# Patient Record
Sex: MALE | HISPANIC OR LATINO | Employment: FULL TIME | URBAN - METROPOLITAN AREA
[De-identification: names, ages, dates, MRNs, and addresses within clinical notes are randomized per-mention and may not be internally consistent; named-entity substitution may affect disease eponyms.]

---

## 2024-05-13 ENCOUNTER — HOSPITAL ENCOUNTER (EMERGENCY)
Facility: MEDICAL CENTER | Age: 19
End: 2024-05-13
Attending: STUDENT IN AN ORGANIZED HEALTH CARE EDUCATION/TRAINING PROGRAM
Payer: COMMERCIAL

## 2024-05-13 VITALS
DIASTOLIC BLOOD PRESSURE: 61 MMHG | TEMPERATURE: 99.4 F | RESPIRATION RATE: 18 BRPM | OXYGEN SATURATION: 96 % | HEART RATE: 86 BPM | WEIGHT: 158 LBS | SYSTOLIC BLOOD PRESSURE: 129 MMHG

## 2024-05-13 DIAGNOSIS — J10.1 INFLUENZA A: ICD-10-CM

## 2024-05-13 LAB
FLUAV RNA SPEC QL NAA+PROBE: POSITIVE
FLUBV RNA SPEC QL NAA+PROBE: NEGATIVE
RSV RNA SPEC QL NAA+PROBE: NEGATIVE
SARS-COV-2 RNA RESP QL NAA+PROBE: NOTDETECTED

## 2024-05-13 RX ORDER — IBUPROFEN 600 MG/1
600 TABLET ORAL EVERY 6 HOURS PRN
Status: DISCONTINUED | OUTPATIENT
Start: 2024-05-13 | End: 2024-05-13 | Stop reason: HOSPADM

## 2024-05-13 RX ORDER — ACETAMINOPHEN 325 MG/1
650 TABLET ORAL ONCE
Status: COMPLETED | OUTPATIENT
Start: 2024-05-13 | End: 2024-05-13

## 2024-05-13 RX ADMIN — IBUPROFEN 600 MG: 600 TABLET, FILM COATED ORAL at 05:36

## 2024-05-13 RX ADMIN — ACETAMINOPHEN 650 MG: 325 TABLET, FILM COATED ORAL at 05:35

## 2024-05-13 ASSESSMENT — PAIN DESCRIPTION - PAIN TYPE
TYPE: ACUTE PAIN
TYPE: ACUTE PAIN

## 2024-05-13 NOTE — ED NOTES
Provided patient with discharge instructions. Patient verbalized understanding of instructions. Patients vitals are within normal ranges for baseline. Patient is AxO 4 and GCS 15. Patient Patient is respirating with equal chest rise and fall bilaterally. Patient ambulated out of ED with steady gait. Patient educated to return to ED if symptoms persist or worsen.

## 2024-05-13 NOTE — ED PROVIDER NOTES
ED Provider Note    CHIEF COMPLAINT  Chief Complaint   Patient presents with    Flu Like Symptoms     Pt reports generalized body aches, cough, SOB w/ cough. Fever of 103.4f. symptoms x yesterday. Mask provided to pt. Rate pain 4/10 aching generalized       EXTERNAL RECORDS REVIEWED  External ED Note 2016 flank pain ED visit and DC    HPI/ROS  LIMITATION TO HISTORY   Select: : None  OUTSIDE HISTORIAN(S):  none    Sony Miller is a 18 y.o. male who presents with myalgias cough and fever to 103.  He notes his symptoms started yesterday.  His girlfriend also has similar symptoms.  Otherwise notes history of asthma as a child but has not had admission or asthma exacerbation in many years.  He is having no vomiting, no diarrhea.  He is tolerating a diet.    PAST MEDICAL HISTORY       SURGICAL HISTORY  patient denies any surgical history    FAMILY HISTORY  History reviewed. No pertinent family history.    SOCIAL HISTORY  Social History     Tobacco Use    Smoking status: Never    Smokeless tobacco: Never   Vaping Use    Vaping Use: Every day    Substances: Nicotine   Substance and Sexual Activity    Alcohol use: Not Currently    Drug use: Yes    Sexual activity: Not on file       CURRENT MEDICATIONS  Home Medications       Reviewed by Dalton Mackenzie R.N. (Registered Nurse) on 05/13/24 at 0503  Med List Status: Not Addressed     Medication Last Dose Status        Patient Mohinder Taking any Medications                           ALLERGIES  Allergies   Allergen Reactions    Other Food      Kiwi fruit       PHYSICAL EXAM  VITAL SIGNS: /75   Pulse (!) 106   Temp (!) 39.7 °C (103.4 °F) (Oral)   Resp 18   Wt 71.7 kg (158 lb)   SpO2 97%    Constitutional: Awake and alert. No acute distress.  Head: NCAT.  HEENT: Normal Conjunctiva. PERRLA.  Neck: Grossly normal range of motion. Airway midline.  Cardiovascular: Normal heart rate, Normal rhythm.  Thorax & Lungs: No respiratory distress. Clear to Auscultation  bilaterally.  Abdomen: Normal inspection. Nontender. Nondistended  Skin: No obvious rash.  Back: No tenderness, No CVA tenderness.   Musculoskeletal: No obvious deformity. Moves all extremities Well.  Neurologic: A&Ox3.   Psychiatric: Mood and affect are appropriate for situation.    EKG/LABS  Results for orders placed or performed during the hospital encounter of 05/13/24   POC CoV-2, FLU A/B, RSV by PCR   Result Value Ref Range    POC Influenza A RNA, PCR POSITIVE (A) Negative    POC Influenza B RNA, PCR Negative Negative    POC RSV, by PCR Negative Negative    POC SARS-CoV-2, PCR NotDetected          COURSE & MEDICAL DECISION MAKING    ASSESSMENT, COURSE AND PLAN  Care Narrative:   18-year-old male history of childhood asthma but otherwise healthy here for myalgias and shortness of breath for 1 day  Febrile and tachycardic on arrival  Clear lungs, no tachypnea or dyspnea.  He is tolerating room air well.  Flu a positive.  He did receive Tylenol and ibuprofen for antipyretic and myalgia symptoms  Advised to quarantine from the elderly or young.  Take Tylenol and Motrin.  Hydrate.  Given no comorbidities he is not a candidate for Tamiflu    ADDITIONAL PROBLEMS MANAGED  none    DISPOSITION AND DISCUSSIONS  I have discussed management of the patient with the following physicians and ALLAN's:  none    Discussion of management with other QHP or appropriate source(s): None     Escalation of care considered, and ultimately not performed:Laboratory analysis and acute inpatient care management, however at this time, the patient is most appropriate for outpatient management    Barriers to care at this time, including but not limited to: Patient does not have established PCP and Patient lacks financial resources.     Decision tools and prescription drugs considered including, but not limited to:  none .    FINAL DIAGNOSIS  No diagnosis found.       Electronically signed by: Erma Michaels D.O., 5/13/2024 5:30 AM

## 2024-05-13 NOTE — ED NOTES
Agree with triage note. Patient ambulatory to room with steady gait. Patient placed on the monitor. Chart up for ERP.

## 2024-05-13 NOTE — DISCHARGE INSTRUCTIONS
He tested positive for influenza A.  Please drink plenty of fluids.  Please take Tylenol and ibuprofen for fevers and muscle aches.

## 2024-05-13 NOTE — ED TRIAGE NOTES
Chief Complaint   Patient presents with    Flu Like Symptoms     Pt reports generalized body aches, cough, SOB w/ cough. Fever of 103.4f. symptoms x yesterday. Mask provided to pt. Rate pain 4/10 aching generalized       19 y/o ambulatory to triage for above complaint.  Covid swab in process     Pt place in waiting area. Educated on triage process. Pt will inform staff of any medical changes.    /75   Pulse (!) 106   Temp (!) 39.7 °C (103.4 °F) (Oral)   Resp 18   Wt 71.7 kg (158 lb)   SpO2 97%     
No

## 2025-07-16 ENCOUNTER — OFFICE VISIT (OUTPATIENT)
Dept: URGENT CARE | Facility: PHYSICIAN GROUP | Age: 20
End: 2025-07-16
Payer: COMMERCIAL

## 2025-07-16 VITALS
DIASTOLIC BLOOD PRESSURE: 60 MMHG | SYSTOLIC BLOOD PRESSURE: 112 MMHG | HEIGHT: 66 IN | TEMPERATURE: 98.2 F | BODY MASS INDEX: 25.46 KG/M2 | OXYGEN SATURATION: 98 % | WEIGHT: 158.4 LBS | RESPIRATION RATE: 20 BRPM | HEART RATE: 88 BPM
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
## 2025-07-16 DIAGNOSIS — L03.113 CELLULITIS OF RIGHT HAND: Primary | ICD-10-CM

## 2025-07-16 DIAGNOSIS — S60.561A INSECT BITE OF RIGHT HAND, INITIAL ENCOUNTER: ICD-10-CM

## 2025-07-16 PROCEDURE — 3074F SYST BP LT 130 MM HG: CPT | Performed by: STUDENT IN AN ORGANIZED HEALTH CARE EDUCATION/TRAINING PROGRAM

## 2025-07-16 PROCEDURE — 3078F DIAST BP <80 MM HG: CPT | Performed by: STUDENT IN AN ORGANIZED HEALTH CARE EDUCATION/TRAINING PROGRAM

## 2025-07-16 PROCEDURE — 99203 OFFICE O/P NEW LOW 30 MIN: CPT | Performed by: STUDENT IN AN ORGANIZED HEALTH CARE EDUCATION/TRAINING PROGRAM

## 2025-07-16 RX ORDER — CEPHALEXIN 500 MG/1
500 CAPSULE ORAL 4 TIMES DAILY
Qty: 20 CAPSULE | Refills: 0 | Status: SHIPPED | OUTPATIENT
Start: 2025-07-16 | End: 2025-07-21

## 2025-07-16 NOTE — PROGRESS NOTES
"Urgent Care Visit Note  [facility]    07/16/25    Sony Miller     [address] [place] [address]     PCP: Pcp Pt States None     Subjective:     Chief Complaint   Patient presents with    Insect Bite     X2 days was bite buy an insect right hand is swelling and spreading.        HPI:  Sony Miller is a 20 y.o. male who presents for right hand redness, swelling, and pain.  Reports that he believes he got an insect bite on his hand 2 days ago on the dorsal surface.  Saw a small bump that looked like a mosquito bite.  Yesterday he was feeling fine but today he woke up with his right hand swollen with an area of redness on the dorsal surface.  Also endorsing more pain today.  Has not been attempting any over-the-counter medications.  No fever or chills.  Prior to this, patient was doing well and at his baseline.    ROS:  ROS     CURRENT MEDICATIONS:  Encounter Medications with Dispense Information[1]    ALLERGIES:   Allergies[2]    PROBLEM LIST:    does not have a problem list on file.    Allergies, Medications, & Tobacco/Substance Use were reconciled by the Medical Assistant and reviewed by myself.     Objective:     /60   Pulse 88   Temp 36.8 °C (98.2 °F) (Temporal)   Resp 20   Ht 1.676 m (5' 6\")   Wt 71.8 kg (158 lb 6.4 oz)   SpO2 98%   BMI 25.57 kg/m²     Physical Exam  Constitutional:       Appearance: Normal appearance.   HENT:      Head: Normocephalic and atraumatic.      Right Ear: External ear normal.      Left Ear: External ear normal.      Nose: Nose normal.   Eyes:      Extraocular Movements: Extraocular movements intact.      Pupils: Pupils are equal, round, and reactive to light.   Cardiovascular:      Rate and Rhythm: Normal rate and regular rhythm.      Pulses: Normal pulses.      Heart sounds: Normal heart sounds.   Pulmonary:      Effort: Pulmonary effort is normal.   Abdominal:      General: Abdomen is flat.   Musculoskeletal:      Right forearm: Normal. "      Right wrist: Normal.      Right hand: Swelling and tenderness present. No deformity, lacerations or bony tenderness. Normal range of motion. Normal strength. Normal sensation. Normal capillary refill. Normal pulse.        Hands:       Comments: Small central bump on dorsal surface of the hand at the location of the suspected insect bite.  Surrounding this the patient was noted to have skin erythema and swelling.   Skin:     General: Skin is warm.      Capillary Refill: Capillary refill takes less than 2 seconds.   Neurological:      Mental Status: He is alert and oriented to person, place, and time.      Gait: Gait normal.   Psychiatric:         Mood and Affect: Mood normal.         Behavior: Behavior normal.               Assessment/Plan:     Patient's history and physical exam consistent with:    Assessment & Plan  Cellulitis of right hand    Orders:    cephALEXin (KEFLEX) 500 MG Cap; Take 1 Capsule by mouth 4 times a day for 5 days.    Insect bite of right hand, initial encounter         Patient presents with right hand swelling, redness, and pain after a presumed insect bite.  On the differential is right hand cellulitis from introduction of bacteria versus an allergic reaction.  Area of redness with tenderness appears consistent with cellulitis.  Will treat empirically with antibiotics.  If the patient's symptoms are due to a reaction, I educated the patient on supportive care including icing the area, antihistamines, and time.  Return precautions and follow-up instructions given.  ER precautions given.    Discussed differential diagnosis, management options, risks/benefits, and alternatives to planned treatment. Pt expressed understanding and the treatment plan was agreed upon. Questions were encouraged and answered. Pt encouraged to return to urgent care as needed if new or worsening symptoms or if there is no improvement in condition. Pt educated in red flags and indications to immediately call 911 or  present to the Emergency Department. Advised the patient to follow-up with the primary care physician for recheck, reevaluation, and further management.    I personally reviewed prior external notes and test results pertinent to today's visit. I have independently reviewed and interpreted all diagnostics ordered during this visit.    Please note that this dictation was created using voice recognition software. I have made a reasonable attempt to correct obvious errors, but I expect that there are errors of grammar and possibly content that I did not discover before finalizing the note.    This note was electronically signed by Ernesto Hansen MD               [1]   Current Outpatient Medications   Medication Sig Refill Last Dispense    cephALEXin (KEFLEX) 500 MG Cap Take 1 Capsule by mouth 4 times a day for 5 days. 0 Unknown (outside pharmacy)   [2]   Allergies  Allergen Reactions    Other Food      Kiwi fruit

## 2025-07-16 NOTE — PATIENT INSTRUCTIONS
Thank you for trusting [facility] for your medical care today. You were seen by Ernesto Hansen MD. We hope that we were able to answer all of your questions, alleviate concerns, and create a plan going forward. If you need anything from us, don't hesitate to reach out.     If you develop any new or worsening symptoms that you believe need urgent or emergent evaluation, be sure to be reevaluated in urgent care or the emergency room.     Ernesto Hansen MD  Urgent Care